# Patient Record
Sex: FEMALE | Race: WHITE | NOT HISPANIC OR LATINO | Employment: FULL TIME | URBAN - METROPOLITAN AREA
[De-identification: names, ages, dates, MRNs, and addresses within clinical notes are randomized per-mention and may not be internally consistent; named-entity substitution may affect disease eponyms.]

---

## 2017-06-09 ENCOUNTER — HOSPITAL ENCOUNTER (EMERGENCY)
Facility: MEDICAL CENTER | Age: 32
End: 2017-06-10
Attending: EMERGENCY MEDICINE
Payer: MEDICAID

## 2017-06-09 DIAGNOSIS — R10.13 EPIGASTRIC PAIN: ICD-10-CM

## 2017-06-09 LAB
ALBUMIN SERPL BCP-MCNC: 4.4 G/DL (ref 3.2–4.9)
ALBUMIN/GLOB SERPL: 1.3 G/DL
ALP SERPL-CCNC: 42 U/L (ref 30–99)
ALT SERPL-CCNC: 8 U/L (ref 2–50)
ANION GAP SERPL CALC-SCNC: 11 MMOL/L (ref 0–11.9)
AST SERPL-CCNC: 11 U/L (ref 12–45)
BASOPHILS # BLD AUTO: 0.4 % (ref 0–1.8)
BASOPHILS # BLD: 0.03 K/UL (ref 0–0.12)
BILIRUB SERPL-MCNC: 0.5 MG/DL (ref 0.1–1.5)
BUN SERPL-MCNC: 13 MG/DL (ref 8–22)
CALCIUM SERPL-MCNC: 10.1 MG/DL (ref 8.5–10.5)
CHLORIDE SERPL-SCNC: 106 MMOL/L (ref 96–112)
CO2 SERPL-SCNC: 20 MMOL/L (ref 20–33)
CREAT SERPL-MCNC: 0.84 MG/DL (ref 0.5–1.4)
EOSINOPHIL # BLD AUTO: 0.21 K/UL (ref 0–0.51)
EOSINOPHIL NFR BLD: 2.5 % (ref 0–6.9)
ERYTHROCYTE [DISTWIDTH] IN BLOOD BY AUTOMATED COUNT: 43.1 FL (ref 35.9–50)
GFR SERPL CREATININE-BSD FRML MDRD: >60 ML/MIN/1.73 M 2
GLOBULIN SER CALC-MCNC: 3.3 G/DL (ref 1.9–3.5)
GLUCOSE SERPL-MCNC: 85 MG/DL (ref 65–99)
HCT VFR BLD AUTO: 41.8 % (ref 37–47)
HGB BLD-MCNC: 13.8 G/DL (ref 12–16)
IMM GRANULOCYTES # BLD AUTO: 0.03 K/UL (ref 0–0.11)
IMM GRANULOCYTES NFR BLD AUTO: 0.4 % (ref 0–0.9)
LIPASE SERPL-CCNC: 14 U/L (ref 11–82)
LYMPHOCYTES # BLD AUTO: 2.3 K/UL (ref 1–4.8)
LYMPHOCYTES NFR BLD: 27.3 % (ref 22–41)
MCH RBC QN AUTO: 28.3 PG (ref 27–33)
MCHC RBC AUTO-ENTMCNC: 33 G/DL (ref 33.6–35)
MCV RBC AUTO: 85.7 FL (ref 81.4–97.8)
MONOCYTES # BLD AUTO: 0.39 K/UL (ref 0–0.85)
MONOCYTES NFR BLD AUTO: 4.6 % (ref 0–13.4)
NEUTROPHILS # BLD AUTO: 5.46 K/UL (ref 2–7.15)
NEUTROPHILS NFR BLD: 64.8 % (ref 44–72)
NRBC # BLD AUTO: 0 K/UL
NRBC BLD AUTO-RTO: 0 /100 WBC
PLATELET # BLD AUTO: 195 K/UL (ref 164–446)
PMV BLD AUTO: 12.2 FL (ref 9–12.9)
POTASSIUM SERPL-SCNC: 3.4 MMOL/L (ref 3.6–5.5)
PROT SERPL-MCNC: 7.7 G/DL (ref 6–8.2)
RBC # BLD AUTO: 4.88 M/UL (ref 4.2–5.4)
SODIUM SERPL-SCNC: 137 MMOL/L (ref 135–145)
WBC # BLD AUTO: 8.4 K/UL (ref 4.8–10.8)

## 2017-06-09 PROCEDURE — 99284 EMERGENCY DEPT VISIT MOD MDM: CPT

## 2017-06-09 PROCEDURE — 85025 COMPLETE CBC W/AUTO DIFF WBC: CPT

## 2017-06-09 PROCEDURE — 82272 OCCULT BLD FECES 1-3 TESTS: CPT

## 2017-06-09 PROCEDURE — 83690 ASSAY OF LIPASE: CPT

## 2017-06-09 PROCEDURE — 36415 COLL VENOUS BLD VENIPUNCTURE: CPT

## 2017-06-09 PROCEDURE — 80053 COMPREHEN METABOLIC PANEL: CPT

## 2017-06-09 ASSESSMENT — PAIN SCALES - GENERAL: PAINLEVEL_OUTOF10: 10

## 2017-06-09 NOTE — ED AVS SNAPSHOT
Home Care Instructions                                                                                                                Shante Chaudhry   MRN: [id number]    Department:  [facility], Emergency Dept   Date of Visit:  6/9/2017            [facility], Emergency Dept    [address] [place] [id number]    Phone:  [phone]      You were seen by     Ben Serrano M.D.      Your Diagnosis Was     Epigastric pain     R10.13       Follow-up Information     1. Follow up with [facility], Emergency Dept In 1 day.    Specialty:  Emergency Medicine    Why:  if not better    Contact information    [facility] [id number] [phone]      Medication Information     Review all of your home medications and newly ordered medications with your primary doctor and/or pharmacist as soon as possible. Follow medication instructions as directed by your doctor and/or pharmacist.     Please keep your complete medication list with you and share with your physician. Update the information when medications are discontinued, doses are changed, or new medications (including over-the-counter products) are added; and carry medication information at all times in the event of emergency situations.               Medication List      Notice     You have not been prescribed any medications.            Procedures and tests performed during your visit     CARDIAC MONITORING    CBC WITH DIFFERENTIAL    COMP METABOLIC PANEL    ESTIMATED GFR    LIPASE    O2 Protocol    SALINE LOCK        Discharge Instructions       Abdominal Pain, Adult  Many things can cause belly (abdominal) pain. Most times, the belly pain is not dangerous. Many cases of belly pain can be watched and treated at home.  HOME CARE   · Do not take medicines that help you go poop (laxatives) unless told to by your doctor.  · Only take medicine as told by your doctor.  · Eat or drink as told by  your doctor. Your doctor will tell you if you should be on a special diet.  GET HELP IF:  · You do not know what is causing your belly pain.  · You have belly pain while you are sick to your stomach (nauseous) or have runny poop (diarrhea).  · You have pain while you pee or poop.  · Your belly pain wakes you up at night.  · You have belly pain that gets worse or better when you eat.  · You have belly pain that gets worse when you eat fatty foods.  · You have a fever.  GET HELP RIGHT AWAY IF:   · The pain does not go away within 2 hours.  · You keep throwing up (vomiting).  · The pain changes and is only in the right or left part of the belly.  · You have bloody or tarry looking poop.  MAKE SURE YOU:   · Understand these instructions.  · Will watch your condition.  · Will get help right away if you are not doing well or get worse.     This information is not intended to replace advice given to you by your health care provider. Make sure you discuss any questions you have with your health care provider.     Document Released: 06/05/2009 Document Revised: 01/08/2016 Document Reviewed: 08/27/2014  [employer] Interactive Patient Education ©2016 [employer]            Patient Information     Patient Information    Following emergency treatment: all patient requiring follow-up care must return either to a private physician or a clinic if your condition worsens before you are able to obtain further medical attention, please return to the emergency room.     Billing Information    At [facility], we work to make the billing process streamlined for our patients.  Our Representatives are here to answer any questions you may have regarding your hospital bill.  If you have insurance coverage and have supplied your insurance information to us, we will submit a claim to your insurer on your behalf.  Should you have any questions regarding your bill, we can be reached online or by phone as follows:  Online: You are able pay your  bills online or live chat with our representatives about any billing questions you may have. We are here to help Monday - Friday from 8:00am to 7:30pm and 9:00am - 12:00pm on Saturdays.  Please visit [email or web address]  for more information.   Phone:  [phone] or [phone]    Please note that your emergency physician, surgeon, pathologist, radiologist, anesthesiologist, and other specialists are not employed by [facility] and will therefore bill separately for their services.  Please contact them directly for any questions concerning their bills at the numbers below:     Emergency Physician Services:  [phone]  [facility]:  [phone]  [facility]:  [phone]  [facility]:  [phone]    1. Your final bill may vary from the amount quoted upon discharge if all procedures are not complete at that time, or if your doctor has additional procedures of which we are not aware. You will receive an additional bill if you return to the Emergency Department at [facility] for suture removal regardless of the facility of which the sutures were placed.     2. Please arrange for settlement of this account at the emergency registration.    3. All self-pay accounts are due in full at the time of treatment.  If you are unable to meet this obligation then payment is expected within 4-5 days.     4. If you have had radiology studies (CT, X-ray, Ultrasound, MRI), you have received a preliminary result during your emergency department visit. Please contact the radiology department [phone] to receive a copy of your final result. Please discuss the Final result with your primary physician or with the follow up physician provided.     Crisis Hotline:  [place] Crisis Hotline:  0-688-BHQPSUQ or 1-892.877.1466  [place] Crisis Hotline:    1-341.607.6623 or 990-940-8578         ED Discharge Follow Up Questions    1. In order to provide  you with very good care, we would like to follow up with a phone call in the next few days.  May we have your permission to contact you?     YES /  NO    2. What is the best phone number to call you? (       )_____-__________    3. What is the best time to call you?      Morning  /  Afternoon  /  Evening                   Patient Signature:  ____________________________________________________________    Date:  ____________________________________________________________

## 2017-06-09 NOTE — ED AVS SNAPSHOT
QuVIS Access Code: [email or web address]  Expires: 7/10/2017 12:03 AM    QuVIS  A secure, online tool to manage your health information     [employer]’s QuVIS® is a secure, online tool that connects you to your personalized health information from the privacy of your home -- day or night - making it very easy for you to manage your healthcare. Once the activation process is completed, you can even access your medical information using the QuVIS dash, which is available for free in the Apple Dash store or Google Play store.     QuVIS provides the following levels of access (as shown below):   My Chart Features   [facility] Primary Care Doctor [facility]  Specialists [facility]  Urgent  Care Non-[facility]  Primary Care  Doctor   Email your healthcare team securely and privately 24/7 X X X X   Manage appointments: schedule your next appointment; view details of past/upcoming appointments X      Request prescription refills. X      View recent personal medical records, including lab and immunizations X X X X   View health record, including health history, allergies, medications X X X X   Read reports about your outpatient visits, procedures, consult and ER notes X X X X   See your discharge summary, which is a recap of your hospital and/or ER visit that includes your diagnosis, lab results, and care plan. X X       How to register for QuVIS:  1. Go to  [email or web address].  2. Click on the Sign Up Now box, which takes you to the New Member Sign Up page. You will need to provide the following information:  a. Enter your QuVIS Access Code exactly as it appears at the top of this page. (You will not need to use this code after you’ve completed the sign-up process. If you do not sign up before the expiration date, you must request a new code.)   b. Enter your date of birth.   c. Enter your home email address.   d. Click Submit, and follow the next screen’s instructions.  3. Create a QuVIS ID. This will be your QuVIS  login ID and cannot be changed, so think of one that is secure and easy to remember.  4. Create a Ember password. You can change your password at any time.  5. Enter your Password Reset Question and Answer. This can be used at a later time if you forget your password.   6. Enter your e-mail address. This allows you to receive e-mail notifications when new information is available in Ember.  7. Click Sign Up. You can now view your health information.    For assistance activating your Ember account, call [phone]

## 2017-06-09 NOTE — ED AVS SNAPSHOT
6/10/2017    Shante Chaudhry  [address]   [place] [address]    Dear Shante:    [facility] wants to ensure your discharge home is safe and you or your loved ones have had all of your questions answered regarding your care after you leave the hospital.    Below is a list of resources and contact information should you have any questions regarding your hospital stay, follow-up instructions, or active medical symptoms.    Questions or Concerns Regarding… Contact   Medical Questions Related to Your Discharge  (7 days a week, 8am-5pm) Contact a Nurse Care Coordinator   [phone]   Medical Questions Not Related to Your Discharge  (24 hours a day / 7 days a week)  Contact the Nurse Health Line   [phone]    Medications or Discharge Instructions Refer to your discharge packet   or contact your [facility] Primary Care Provider   [phone]   Follow-up Appointment(s) Schedule your appointment via WOMN   or contact Scheduling [phone]   Billing Review your statement via WOMN  or contact Billing [phone]   Medical Records Review your records via WOMN   or contact Medical Records [phone]     You may receive a telephone call within two days of discharge. This call is to make certain you understand your discharge instructions and have the opportunity to have any questions answered. You can also easily access your medical information, test results and upcoming appointments via the WOMN free online health management tool. You can learn more and sign up at Whistlestop/WOMN. For assistance setting up your WOMN account, please call [phone].    Once again, we want to ensure your discharge home is safe and that you have a clear understanding of any next steps in your care. If you have any questions or concerns, please do not hesitate to contact us, we are here for you. Thank you for choosing [facility] for your healthcare needs.    Sincerely,    Your [facility] Healthcare Team

## 2017-06-10 VITALS
SYSTOLIC BLOOD PRESSURE: 132 MMHG | TEMPERATURE: 97.3 F | WEIGHT: 276.46 LBS | HEIGHT: 63 IN | OXYGEN SATURATION: 96 % | RESPIRATION RATE: 19 BRPM | DIASTOLIC BLOOD PRESSURE: 72 MMHG | HEART RATE: 62 BPM | BODY MASS INDEX: 48.98 KG/M2

## 2017-06-10 NOTE — DISCHARGE INSTRUCTIONS
Abdominal Pain, Adult  Many things can cause belly (abdominal) pain. Most times, the belly pain is not dangerous. Many cases of belly pain can be watched and treated at home.  HOME CARE   · Do not take medicines that help you go poop (laxatives) unless told to by your doctor.  · Only take medicine as told by your doctor.  · Eat or drink as told by your doctor. Your doctor will tell you if you should be on a special diet.  GET HELP IF:  · You do not know what is causing your belly pain.  · You have belly pain while you are sick to your stomach (nauseous) or have runny poop (diarrhea).  · You have pain while you pee or poop.  · Your belly pain wakes you up at night.  · You have belly pain that gets worse or better when you eat.  · You have belly pain that gets worse when you eat fatty foods.  · You have a fever.  GET HELP RIGHT AWAY IF:   · The pain does not go away within 2 hours.  · You keep throwing up (vomiting).  · The pain changes and is only in the right or left part of the belly.  · You have bloody or tarry looking poop.  MAKE SURE YOU:   · Understand these instructions.  · Will watch your condition.  · Will get help right away if you are not doing well or get worse.     This information is not intended to replace advice given to you by your health care provider. Make sure you discuss any questions you have with your health care provider.     Document Released: 06/05/2009 Document Revised: 01/08/2016 Document Reviewed: 08/27/2014  [email or web address] Interactive Patient Education ©2016 [employer]

## 2017-06-10 NOTE — ED NOTES
Shante Chaudhry  31 y.o.  female  Chief Complaint   Patient presents with   • Abdominal Pain     Present to triage c/o diffused abdominal pain since 7 pm. Patient took pepto bismol around 6:30 pm. Described pain as sharp / stabbing. Denies N/V.

## 2017-06-10 NOTE — ED NOTES
Patient was educated on discharge instructions.  Patient was informed about diagnosis, symptom management, risks, and home care instructions.  Patient verbalized understanding and signed discharge instructions. Copy of discharge instructions in chart.  Patient ambulated out with family.  Patient has personal belongings and PIV removed, tip intact.  Work note given.

## 2017-06-10 NOTE — ED NOTES
"Pt wheeled to room with SO. PT c/o of midline ab pain 9/10 pain described as \"bubbly, gas\"  Once pt in room, pt states she passed gas and pain is now 5/10.  Pt took pepto at home and no relief.    "

## 2017-06-10 NOTE — ED PROVIDER NOTES
"ED Provider Note    CHIEF COMPLAINT  Chief Complaint   Patient presents with   • Abdominal Pain       HPI  Shante Chaudhry is a 31 y.o. female who presents to the emergency department with abdominal discomfort. The patient states the pain started in epigastric region couple of hours prior to arrival. She described it as burning. She states that she did pass some flatulence and since that time she's been significantly better. She has not had any associated vomiting. She denies fevers. She is not any prior abdominal surgeries. On my exam the patient was a symptomatic.    REVIEW OF SYSTEMS  See HPI for further details. All other systems are negative.     PAST MEDICAL HISTORY  Past Medical History   Diagnosis Date   • ASTHMA      dx as a child. no meds        SOCIAL HISTORY  Social History     Social History   • Marital Status: Single     Spouse Name: N/A   • Number of Children: N/A   • Years of Education: N/A     Social History Main Topics   • Smoking status: Current Some Day Smoker -- 0.50 packs/day     Types: Cigarettes     Last Attempt to Quit: 07/19/2011   • Smokeless tobacco: None   • Alcohol Use: No   • Drug Use: No   • Sexual Activity:     Partners: Male     Other Topics Concern   • None     Social History Narrative           PHYSICAL EXAM  VITAL SIGNS: /72 mmHg  Pulse 78  Temp(Src) 36.3 °C (97.3 °F) (Temporal)  Resp 18  Ht 1.6 m (5' 3\")  Wt 125.4 kg (276 lb 7.3 oz)  BMI 48.98 kg/m2  SpO2 96%  Constitutional: Well developed, Well nourished, No acute distress, Non-toxic appearance.   HENT: Normocephalic, Atraumatic, tympanic membranes are intact and nonerythematous bilaterally, Oropharynx moist without exudates or erythema, Nose normal.   Eyes: PERRLA, EOMI, Conjunctiva normal.  Neck: Supple without meningismus  Lymphatic: No lymphadenopathy noted.   Cardiovascular: Normal heart rate, Normal rhythm, No murmurs, No rubs, No gallops.   Thorax & Lungs: Normal breath sounds, No respiratory distress, No " wheezing, No chest tenderness.   Abdomen: Bowel sounds normal, Soft, No tenderness, no rebound, no guarding, no distention, No masses, No pulsatile masses.   Skin: Warm, Dry, No erythema, No rash.   Back: No tenderness, No CVA tenderness.   Genitalia: External genitalia appear normal, No masses or lesions. No discharge.   Rectal: Normal appearance, Normal sphincter tone. No external or internal lesions noted. Stool is normal color and heme negative.   Extremities: Atraumatic with symmetric distal pulses, No edema, No tenderness, No cyanosis, No clubbing.   Neurologic: Alert & oriented x 3, cranial nerves II through XII are intact, Normal motor function, Normal sensory function, No focal deficits noted.   Psychiatric: Affect normal, Judgment normal, Mood normal.       COURSE & MEDICAL DECISION MAKING  Pertinent Labs & Imaging studies reviewed. (See chart for details)  This a 31-year-old female who presents with epigastric abdominal discomfort. The patient states she's had some flatulence prior to my exam at this time she states she is asymptomatic. Blood work was drawn in triage and does not show any evidence of a leukocytosis to support a significant infectious source. Furthermore the patient's lipase and metabolic panel does not support pancreatitis nor cholecystitis. Gastritis was still be in the differential. Her abdomen is benign at this time. Therefore we'll discharge her home with instructions to return if she has any increase in discomfort. At this time I don't have a clear etiology. She does not have any urinary symptoms.    FINAL IMPRESSION  1. Abdominal pain     Disposition  The patient will be discharged in stable condition    Electronically signed by: Ben Serrano, 6/9/2017 11:40 PM

## 2017-06-10 NOTE — ED NOTES
Pt to quick look 2 for blood draw. Blood collected and sent to lab. Pt unable to urinate. Urine cup given to pt with instructions for clean catch urine sample. Pt back to lobby.

## 2017-09-26 ENCOUNTER — OFFICE VISIT (OUTPATIENT)
Dept: MEDICAL GROUP | Facility: MEDICAL CENTER | Age: 32
End: 2017-09-26
Attending: NURSE PRACTITIONER
Payer: MEDICAID

## 2017-09-26 VITALS
WEIGHT: 280 LBS | HEART RATE: 72 BPM | OXYGEN SATURATION: 96 % | DIASTOLIC BLOOD PRESSURE: 72 MMHG | HEIGHT: 62 IN | TEMPERATURE: 97.5 F | RESPIRATION RATE: 16 BRPM | SYSTOLIC BLOOD PRESSURE: 115 MMHG | BODY MASS INDEX: 51.53 KG/M2

## 2017-09-26 DIAGNOSIS — Z13.21 ENCOUNTER FOR VITAMIN DEFICIENCY SCREENING: ICD-10-CM

## 2017-09-26 DIAGNOSIS — Z13.220 SCREENING CHOLESTEROL LEVEL: ICD-10-CM

## 2017-09-26 DIAGNOSIS — E66.01 MORBID OBESITY WITH BMI OF 50.0-59.9, ADULT (HCC): ICD-10-CM

## 2017-09-26 DIAGNOSIS — F41.9 ANXIETY: ICD-10-CM

## 2017-09-26 DIAGNOSIS — Z71.6 ENCOUNTER FOR TOBACCO USE CESSATION COUNSELING: ICD-10-CM

## 2017-09-26 DIAGNOSIS — J40 BRONCHITIS: ICD-10-CM

## 2017-09-26 DIAGNOSIS — Z86.2 HX OF IRON DEFICIENCY ANEMIA: ICD-10-CM

## 2017-09-26 DIAGNOSIS — M25.551 ACUTE PAIN OF RIGHT HIP: ICD-10-CM

## 2017-09-26 DIAGNOSIS — Z01.419 ENCOUNTER FOR GYNECOLOGICAL EXAMINATION WITHOUT ABNORMAL FINDING: ICD-10-CM

## 2017-09-26 DIAGNOSIS — Z71.6 ENCOUNTER FOR SMOKING CESSATION COUNSELING: ICD-10-CM

## 2017-09-26 DIAGNOSIS — R05.9 COUGH: ICD-10-CM

## 2017-09-26 DIAGNOSIS — Z13.29 SCREENING FOR THYROID DISORDER: ICD-10-CM

## 2017-09-26 DIAGNOSIS — Z00.00 ROUTINE HEALTH MAINTENANCE: ICD-10-CM

## 2017-09-26 DIAGNOSIS — J45.20 MILD INTERMITTENT ASTHMA WITHOUT COMPLICATION: ICD-10-CM

## 2017-09-26 DIAGNOSIS — Z13.1 SCREENING FOR DIABETES MELLITUS (DM): ICD-10-CM

## 2017-09-26 PROCEDURE — 99204 OFFICE O/P NEW MOD 45 MIN: CPT | Performed by: NURSE PRACTITIONER

## 2017-09-26 PROCEDURE — 99203 OFFICE O/P NEW LOW 30 MIN: CPT | Performed by: NURSE PRACTITIONER

## 2017-09-26 RX ORDER — POLYETHYLENE GLYCOL 3350 17 G
2 POWDER IN PACKET (EA) ORAL EVERY 4 HOURS PRN
Qty: 120 LOZENGE | Refills: 0 | Status: SHIPPED | OUTPATIENT
Start: 2017-09-26 | End: 2019-06-12

## 2017-09-26 RX ORDER — DICLOFENAC SODIUM 75 MG/1
75 TABLET, DELAYED RELEASE ORAL 2 TIMES DAILY
Qty: 60 TAB | Refills: 1 | Status: SHIPPED | OUTPATIENT
Start: 2017-09-26 | End: 2019-06-12

## 2017-09-26 RX ORDER — AZITHROMYCIN 250 MG/1
TABLET, FILM COATED ORAL
Qty: 6 TAB | Refills: 0 | Status: SHIPPED | OUTPATIENT
Start: 2017-09-26 | End: 2019-06-12

## 2017-09-26 RX ORDER — BENZONATATE 100 MG/1
100 CAPSULE ORAL 3 TIMES DAILY PRN
Qty: 30 CAP | Refills: 0 | Status: SHIPPED | OUTPATIENT
Start: 2017-09-26 | End: 2019-06-12

## 2017-09-26 RX ORDER — ALBUTEROL SULFATE 90 UG/1
2 AEROSOL, METERED RESPIRATORY (INHALATION) EVERY 6 HOURS PRN
Qty: 8.5 G | Refills: 2 | Status: SHIPPED | OUTPATIENT
Start: 2017-09-26 | End: 2019-06-12 | Stop reason: SDUPTHER

## 2017-09-26 ASSESSMENT — PATIENT HEALTH QUESTIONNAIRE - PHQ9: CLINICAL INTERPRETATION OF PHQ2 SCORE: 0

## 2017-09-26 ASSESSMENT — PAIN SCALES - GENERAL: PAINLEVEL: NO PAIN

## 2017-09-26 NOTE — ASSESSMENT & PLAN NOTE
"Pt reports a few days of \"sick\"  Kids are sick. Runny nose, sore throat, cough. No fever.  Mucus is \"bright yellow\". Cut back to a few cigarettes per day  No wheezing or SOB. Ears are \"fine\". Feels \"like I want to go home and sleep\".  She has hx of Asthma and tobacco use - smoking.  "

## 2017-09-26 NOTE — ASSESSMENT & PLAN NOTE
"Pt is over weight and BMO today= 51.21.  She reports she \"stress eats\" and trying to decrease sugar and carbs.  Discussed checking labs including TSH and Pt to read labels and   Work on cutting down on Sugar/Carbs and re-address at f/u visit.    "

## 2017-09-26 NOTE — ASSESSMENT & PLAN NOTE
"Pt reports \"off and on\" for a few years.  Lately, the past month worse pain to right hip.  No known Injury. Using Ibuprofen otc and \"helps a little\".  Getting out of car or using stairs makes it worse.  Pt is over-weight and understands/.  Denies weakness or falls.  Denies swelling but states  :\"just hurts\" most of the time.  "

## 2017-09-26 NOTE — ASSESSMENT & PLAN NOTE
"Pt reports has smoked 1/2 to 1/4 ppd \"off and on\" for about 4 years.  Recently down to just a few cigarettes per day.  Is wanting to quit completley, but often feels like smoking again when   \"stressed out\". 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  Discussed low dose Nicotine lozenges to assist her rather than patches.  Pt agrees.  "

## 2017-09-26 NOTE — ASSESSMENT & PLAN NOTE
Asthma as child.   Rare flare-ups as adult.   Typically gets wheezing if bad URI.  Would like Albuterol Inhaler for rare use.  Is is the process of quitting smoking and we discussed the importance of this.  Pt denies current wheezing despite URI symptoms.

## 2017-09-26 NOTE — ASSESSMENT & PLAN NOTE
"Pt reports she has anxiety and [facility]  Has been having counseling and is helping.   When young was on antidepressant.   Denies depression. States anxiety is \"under control\"  Rare Panic Attacks and usually gets tightness in chest and tries relax.  Last panic attack 1 month ago.   "

## 2017-09-26 NOTE — PROGRESS NOTES
"    Chief Complaint   Patient presents with   • New Patient   • Cough   • Nasal Congestion   • Hip Pain     right        HISTORY OF THE PRESENT ILLNESS: Patient is a 31 y.o. female.  Shante presents to the clinic to establish care as a NEW PATIENT.    Her PMH includes:    Asthma  Depression/Anxiety  High Risk Pregnancy  Morbid Obesity  Left knuckle surgery  Left arm surgery-ORIF  Tubal ligation  Tobacco use-Smoker  Marijuana use  Obesity with pregnancy  Hip Pain, Right    Established with     Counseling -Therapy for anxiety--> [facility]      REview of REcords shows:  6/9/17 ER visit for Abd pain/epigastric discomfort, flatulence, Exam negative. Flatulence and pain subsided.  8/12/15 ER visit for SOB, No asthma exacerbations in over 10 yrs. , Cough, vomit 2' cough.  CXR normal.  RX for Prednisone and Albuterol Inhaler.    [facility] Report shows:  No entries.    Routine health maintenance  LMP = 9/5/17  Last PAP= 4 years ago. Reports some anxiety and emotional issues since a child about pelvic exams.  Wishes to see GYN and have  present for GYN/PAP exam.---> will refer to GYN.    Pt reports due for eye exam as typically wears glasses and has [facility] number at home and will call.  Pt not interested in [facility] info at this time.      Morbid obesity with BMI of 50.0-59.9, adult ([facility])  Pt is over weight and BMO today= 51.21.  She reports she \"stress eats\" and trying to decrease sugar and carbs.  Discussed checking labs including TSH and Pt to read labels and   Work on cutting down on Sugar/Carbs and re-address at f/u visit.    Acute pain of right hip  Pt reports \"off and on\" for a few years.  Lately, the past month worse pain to right hip.  No known Injury. Using Ibuprofen otc and \"helps a little\".  Getting out of car or using stairs makes it worse.  Pt is over-weight and understands/.  Denies weakness or falls.  Denies swelling but states  \"just hurts\" most of the time.    Cough  Pt reports a few " "days of \"sick\"  Kids are sick. Runny nose, sore throat, cough. No fever.  Mucus is \"bright yellow\". Cut back to a few cigarettes per day  No wheezing or SOB. Ears are \"fine\". Feels \"like I want to go home and sleep\".  She has hx of Asthma and tobacco use - smoking.    Mild intermittent asthma without complication  Asthma as child.   Rare flare-ups as adult.   Typically gets wheezing if bad URI.  Would like Albuterol Inhaler for rare use.  Is is the process of quitting smoking and we discussed the importance of this.  Pt denies current wheezing despite URI symptoms.    Anxiety  Pt reports she has anxiety and attends [facility]".  Has been having counseling and is helping.   When young was on antidepressant.   Denies depression. States anxiety is \"under control\"  Rare Panic Attacks and usually gets tightness in chest and tries relax.  Last panic attack 1 month ago. Not interested in Psychiatry or medication as states  \"i'm doing fine\"      Encounter for tobacco use cessation counseling  Pt reports has smoked 1/2 to 1/4 ppd \"off and on\" for about 4 years.  Recently down to just a few cigarettes per day.  Is wanting to quit completley, but often feels like smoking again when   \"stressed out\". 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  Discussed low dose Nicotine lozenges to assist her rather than patches.  Pt agrees.      Allergies: Nkda [no known drug allergy]    Current Outpatient Prescriptions Ordered in [facility]   Medication Sig Dispense Refill   • azithromycin (ZITHROMAX) 250 MG Tab Z pack-Take 2 tabs day one and then 1 tab daily for a total of 5 days 6 Tab 0   • benzonatate (TESSALON) 100 MG Cap Take 1 Cap by mouth 3 times a day as needed. 30 Cap 0   • albuterol 108 (90 Base) MCG/ACT Aero Soln inhalation aerosol Inhale 2 Puffs by mouth every 6 hours as needed for Shortness of Breath. 8.5 g 2   • diclofenac EC (VOLTAREN) 75 MG Tablet Delayed Response Take 1 Tab by mouth 2 times a day. 60 Tab 1   • nicotine " polacrilex (COMMIT) 2 MG lozenge Place 1 Lozenge in mouth by cheek every four hours as needed. 120 Lozenge 0     No current Epic-ordered facility-administered medications on file.        Past Medical History:   Diagnosis Date   • Anxiety    • ASTHMA     dx as a child. no meds    • Depression        Past Surgical History:   Procedure Laterality Date   • REPEAT C SECTION W TUBAL LIGATION  2013    Performed by Polly Wilson M.D. at [facility]   • TUBAL COAGULATION LAPAROSCOPIC BILATERAL     • PRIMARY C SECTION      twins    • OPEN REDUCTION      left arm   • OTHER ORTHOPEDIC SURGERY      left knuckle and left arm       Social History   Substance Use Topics   • Smoking status: Current Some Day Smoker     Packs/day: 0.25     Years: 4.00     Types: Cigarettes     Last attempt to quit: 2011   • Smokeless tobacco: Never Used   • Alcohol use Yes      Comment: social        Family Status   Relation Status   • Mother Alive   • Father Alive   • Sister Alive   • Maternal Grandmother    • Maternal Grandfather Alive   • Paternal Grandmother    • Paternal Grandfather Alive     Family History   Problem Relation Age of Onset   • Diabetes Maternal Grandmother      insulin   • Heart Disease Maternal Grandmother    • Cancer Paternal Grandfather      lung cancer (smoking)       Review of Systems   Constitutional: Negative for fever, chills, weight loss and malaise/fatigue. Positive for weight problem  HENT: Negative for ear pain, nosebleeds, congestion, sore throat and neck pain.    Eyes: Negative for blurred vision.   Respiratory: Negative for shortness of breath and wheezing. Positive for cough and nasal congestion    Cardiovascular: Negative for chest pain, palpitations, orthopnea and leg swelling.   Gastrointestinal: Negative for heartburn, nausea, vomiting and abdominal pain.   Genitourinary: Negative for dysuria, urgency and frequency.   Musculoskeletal: Negative for myalgias,  "back pain. Positive for Right Hip Pain   Skin: Negative for rash and itching.   Neurological: Negative for dizziness, tingling, tremors, sensory change, focal weakness and headaches.   Endo/Heme/Allergies: Does not bruise/bleed easily.   Psychiatric/Behavioral: Negative for depression or memory loss. Positive for anxiety       Current medications, allergies, and problem list reviewed with patient and updated in  [facility] today.      Exam: Blood pressure 115/72, pulse 72, temperature 36.4 °C (97.5 °F), resp. rate 16, height 1.575 m (5' 2\"), weight (!) 127 kg (280 lb), last menstrual period 09/05/2017, SpO2 96 %, not currently breastfeeding.  General: OBESE appearing. Mild distress.  HEENT: Normocephalic. Eyes conjunctiva clear lids without ptosis, pupils equal and reactive to light accommodation, ears normal shape and contour, canals are clear bilaterally, TM's are benign, nasal mucosa benign, oropharynx is without erythema, edema or exudates.   Neck: Supple without JVD. Thyroid is not enlarged. No lymph node swelling or anterior neck tenderness.  Pulmonary: Scattered rhonchi to upper lung fields to ausculation.  Normal effort. No rales or wheezing.  Cardiovascular: Regular rate and rhythm. Radial pulses are intact and equal bilaterally.  Abdomen: Soft, nontender, nondistended.   Neurologic: Grossly nonfocal  Lymph: No cervical or supraclavicular lymph nodes are palpable  Skin: Warm and dry.  No obvious lesions.  Musculoskeletal: Normal gait but slow. Good balance.. No extremity cyanosis, clubbing, or edema.  Psych: Normal mood and affect. Alert and oriented x3. Judgment and insight is normal.      Assessment/Plan  1. Routine health maintenance  CBC WITH DIFFERENTIAL   2. Screening for thyroid disorder  TSH   3. Encounter for vitamin deficiency screening  VITAMIN D,25 HYDROXY    VITAMIN B12   4. Screening for diabetes mellitus (DM)  COMP METABOLIC PANEL    HEMOGLOBIN A1C   5. Screening cholesterol level  LIPID PROFILE "   6. Morbid obesity with BMI of 50.0-59.9, adult ([facility])  Patient identified as having weight management issue.  Appropriate orders and counseling given.  Shante to reduce sugar/carb intake and read labels.   7. Acute pain of right hip  DX-HIP-BILATERAL-WITH PELVIS-2 VIEWS    diclofenac EC (VOLTAREN) 75 MG Tablet Delayed Response (Stop otc Ibuprofen)    REFERRAL TO PHYSICAL THERAPY Reason for Therapy: Eval/Treat/Report   8. Cough  benzonatate (TESSALON) 100 MG Cap   9. Anxiety  Continue w Counseling via [facility]   10. Hx of iron deficiency anemia  IRON/TOTAL IRON BIND    FERRITIN   11. Encounter for tobacco use cessation counseling  nicotine polacrilex (COMMIT) 2 MG lozenge   12. Bronchitis  azithromycin (ZITHROMAX) 250 MG Tab   13. Mild intermittent asthma without complication  albuterol 108 (90 Base) MCG/ACT Aero Soln inhalation aerosol        15. Encounter for gynecological examination without abnormal finding  REFERRAL TO GYNECOLOGY as Pt has anxiety and issues w pelvic exams. Last PAP ~ 4 yrs ago.   Follow up in 4 weeks Call or return if questions, concerns, or worsening condition.

## 2017-09-26 NOTE — ASSESSMENT & PLAN NOTE
LMP = 9/5/17  Last PAP= 4 years ago. Reports some anxiety and emotional issues since a child about pelvic exams.  Wishes to see GYN and have  present for GYN/PAP exam.---> will refer to GYN.    Pt reports due for eye exam as typically wears glasses and has [facility] number at home and will call.  Pt not interested in [facility] info at this time.

## 2018-05-25 ENCOUNTER — HOSPITAL ENCOUNTER (EMERGENCY)
Facility: MEDICAL CENTER | Age: 33
End: 2018-05-25
Attending: EMERGENCY MEDICINE
Payer: MEDICAID

## 2018-05-25 VITALS
SYSTOLIC BLOOD PRESSURE: 114 MMHG | OXYGEN SATURATION: 95 % | HEIGHT: 63 IN | HEART RATE: 83 BPM | RESPIRATION RATE: 18 BRPM | DIASTOLIC BLOOD PRESSURE: 43 MMHG | TEMPERATURE: 98.1 F | BODY MASS INDEX: 51.91 KG/M2 | WEIGHT: 293 LBS

## 2018-05-25 DIAGNOSIS — R05.8 PRODUCTIVE COUGH: ICD-10-CM

## 2018-05-25 DIAGNOSIS — J06.9 UPPER RESPIRATORY TRACT INFECTION, UNSPECIFIED TYPE: ICD-10-CM

## 2018-05-25 PROCEDURE — 700102 HCHG RX REV CODE 250 W/ 637 OVERRIDE(OP): Performed by: EMERGENCY MEDICINE

## 2018-05-25 PROCEDURE — 99284 EMERGENCY DEPT VISIT MOD MDM: CPT

## 2018-05-25 PROCEDURE — A9270 NON-COVERED ITEM OR SERVICE: HCPCS | Performed by: EMERGENCY MEDICINE

## 2018-05-25 RX ORDER — BENZONATATE 100 MG/1
100 CAPSULE ORAL 3 TIMES DAILY PRN
Qty: 15 CAP | Refills: 0 | Status: SHIPPED | OUTPATIENT
Start: 2018-05-25 | End: 2019-06-12

## 2018-05-25 RX ORDER — AZITHROMYCIN 250 MG/1
TABLET, FILM COATED ORAL
Qty: 6 TAB | Refills: 0 | Status: SHIPPED | OUTPATIENT
Start: 2018-05-25 | End: 2019-06-12

## 2018-05-25 RX ADMIN — HYDROCODONE BITARTRATE AND ACETAMINOPHEN 15 ML: 7.5; 325 SOLUTION ORAL at 23:02

## 2018-05-26 NOTE — ED PROVIDER NOTES
ED Provider Note    HPI: Patient is a 32-year-old female who presented to the emergency department May 25, 2018 at 9:38 PM with a chief complaint of cough and congestion.    Patient has had a nonproductive cough for several days but this evening it became productive of copious yellowish sputum. The patient denies headache neck stiffness or photophobia. No chest pain no shortness of breath except after coughing. No fever no chills. No nausea vomiting or abdominal pain. No other somatic complaints    Review of Systems: Positive for productive cough shortness of breath with cough negative for fever chills nausea vomiting abdominal pain headache neck stiffness photophobia chest pain    Past medical/surgical history: Obesity and asthma as a child anxiety depression    Medications: Albuterol when necessary    Allergies: None    Social History: Patient smokes one quarter pack of cigarettes per day social user of alcohol      Physical exam: Constitutional: Pleasant female awake alert  Vital signs:  Temperature 98.1 pulse 80 respirations 18 blood pressure 114/43 pulse oximetry 93% moist sounding cough noted.  Cardiac: Regular rate and rhythm. S1-S2 present. No S3 or S4 present. No murmurs, rubs, or gallops heard. No edema or varicosities were seen.   Lungs: Clear to auscultation with good aeration throughout. No wheezes, rales, or rhonchi heard. Patient's chest wall moved symmetrically with each respiratory effort. Patient was not making use of accessory muscles of respiration in breathing.  Abdomen: Soft nontender to palpation. No rebound or guarding elicited. No organomegaly identified. No pulsatile abdominal masses identified.   Neurologic: alert and awake answers questions appropriately. Moves all four extremities independently, no gross focal abnormalities identified. Normal strength and motor.  Skin: no rash or lesion seen, no palpable dermatologic lesions identified.      Medical decision making:  Patient has no signs  or symptoms of pneumonia or meningitis. Given the presence of productive cough without a course of antibiotics is reasonable patient discharged on a Z-Chris and Tessalon. Patient given the usual discharge instructions for URI. She is counseled to rest and push fluids. She was given a one-time dose of cough medication in the department. She'll follow up with primary care provider for recheck. She is carefully counseled to return to ED for worsening cough fever vomiting or any other problems    Patient verbalized understanding of these instructions and states she will comply    Impression #1) productive cough  #2) URI

## 2018-05-26 NOTE — ED NOTES
Pt given discharge instructions. Pt verbalized understanding. VSS. Pt ambulates with steady gait.

## 2018-05-26 NOTE — ED TRIAGE NOTES
"Shante Chaudhry  32 y.o. female  Chief Complaint   Patient presents with   • Cough     X 1 day. Pt states, \"I've been coughing alot of yellow phlegm\"   • Shortness of Breath       Pt amb to triage with steady gait for above complaint. Mask placed prior to triage. Pt denies fevers/chills at home.  Pt is alert and oriented, speaking in full sentences, follows commands and responds appropriately to questions. NAD. Resp are even and unlabored.  Pt placed in lobby. Pt educated on triage process. Pt encouraged to alert staff for any changes.    "

## 2019-01-03 ENCOUNTER — OFFICE VISIT (OUTPATIENT)
Dept: MEDICAL GROUP | Facility: MEDICAL CENTER | Age: 34
End: 2019-01-03
Attending: NURSE PRACTITIONER
Payer: MEDICAID

## 2019-01-03 VITALS
WEIGHT: 293 LBS | RESPIRATION RATE: 16 BRPM | OXYGEN SATURATION: 94 % | HEART RATE: 74 BPM | TEMPERATURE: 97.9 F | BODY MASS INDEX: 51.91 KG/M2 | DIASTOLIC BLOOD PRESSURE: 78 MMHG | HEIGHT: 63 IN | SYSTOLIC BLOOD PRESSURE: 122 MMHG

## 2019-01-03 DIAGNOSIS — E66.01 MORBID OBESITY WITH BMI OF 50.0-59.9, ADULT (HCC): ICD-10-CM

## 2019-01-03 DIAGNOSIS — F32.1 CURRENT MODERATE EPISODE OF MAJOR DEPRESSIVE DISORDER, UNSPECIFIED WHETHER RECURRENT (HCC): ICD-10-CM

## 2019-01-03 DIAGNOSIS — R10.11 RIGHT UPPER QUADRANT PAIN: ICD-10-CM

## 2019-01-03 LAB
APPEARANCE UR: CLEAR
BILIRUB UR STRIP-MCNC: NORMAL MG/DL
COLOR UR AUTO: YELLOW
GLUCOSE UR STRIP.AUTO-MCNC: NORMAL MG/DL
KETONES UR STRIP.AUTO-MCNC: NORMAL MG/DL
LEUKOCYTE ESTERASE UR QL STRIP.AUTO: NORMAL
NITRITE UR QL STRIP.AUTO: NORMAL
PH UR STRIP.AUTO: 6.5 [PH] (ref 5–8)
PROT UR QL STRIP: NORMAL MG/DL
RBC UR QL AUTO: NORMAL
SP GR UR STRIP.AUTO: 1.01
UROBILINOGEN UR STRIP-MCNC: 0.2 MG/DL

## 2019-01-03 PROCEDURE — 99213 OFFICE O/P EST LOW 20 MIN: CPT | Performed by: NURSE PRACTITIONER

## 2019-01-03 PROCEDURE — 99214 OFFICE O/P EST MOD 30 MIN: CPT | Performed by: FAMILY MEDICINE

## 2019-01-03 PROCEDURE — 81002 URINALYSIS NONAUTO W/O SCOPE: CPT | Performed by: FAMILY MEDICINE

## 2019-01-03 RX ORDER — NITROFURANTOIN MACROCRYSTALS 100 MG/1
100 CAPSULE ORAL 4 TIMES DAILY
Qty: 20 CAP | Refills: 0 | Status: SHIPPED | OUTPATIENT
Start: 2019-01-03 | End: 2019-06-12

## 2019-01-04 NOTE — PROGRESS NOTES
"Subjective:      Shante Chaudhry is a 33 y.o. female who presents with No chief complaint on file.            HPI 1.  Right upper quadrant pain-patient reports onset of persistent initially steady right upper quadrant pain 1 week ago.  That pain lasted for 2-3 days then was gone for about 24 hours.  Since then it has recurred at 4-5-minute episodes 2-3 times per day.  Patient reports that when the pain does come back even though it is brief it is extremely uncomfortable.  It does not seem to be precipitated by taking and fatty foods.  Patient reports that she actually is eating very little.  Patient is reporting normal stools for her-formed brown 2-3 times per day.  No recent emesis.  2.  Depression-patient reports that she was treated for depression but not since high school.  She did have sertraline in the past.  She reports that she has had increasing tearfulness and just a general feeling of high stress over the past 2-3 weeks.  She denies suicidal ideation, confusion.    ROS negative for recent hematuria, positive for low volume chronic urinary incontinence (2 years), negative for recent fever  Social history-, working  Current medication-  Prior to Admission medications    Medication Sig Start Date End Date Taking? Authorizing Provider   sertraline (ZOLOFT) 50 MG Tab Take 1 Tab by mouth every day. 1/3/19  Yes Charbel Moore M.D.                                                                          Objective:     /78 (BP Location: Left arm, Patient Position: Sitting, BP Cuff Size: Adult)   Pulse 74   Temp 36.6 °C (97.9 °F) (Temporal)   Resp 16   Ht 1.6 m (5' 3\")   Wt (!) 138.3 kg (305 lb)   SpO2 94%   BMI 54.03 kg/m²      Physical Exam  Gen.- alert, cooperative, in no acute distress  Neck- midline trachea, thyroid not enlarged or tender,supple, no cervical adenopathy  Chest-clear to auscultation and percussion with normal breath sounds. No retractions. Chest wall nontender  Cardiac- " regular rhythm and rate. No murmur, thrill, or heave  Psych-normal affect with good eye contact. Normal grooming. Oriented x4.  Abdomen- normal bowel sounds, soft without guarding. Liver and spleen not enlarged, no palpable masses or tenderness-indicated tenderness when it is present is lateral upper right  quadrant     UA-notable for positive nitrate and small leukocyte, negative RBC     Assessment/Plan:     1. Right upper quadrant pain      2. Morbid obesity with BMI of 50.0-59.9, adult (HCC)      3. Current moderate episode of major depressive disorder, unspecified whether recurrent (HCC)      Plan: 1.  Begin sertraline 50 mg nightly  2.  Patient is encouraged to limit portions and achieve gradual weight loss  3.  Rx of Macrodantin 100 mg 4 times daily  4.  Abdominal ultrasound

## 2019-04-23 ENCOUNTER — TELEPHONE (OUTPATIENT)
Dept: MEDICAL GROUP | Facility: MEDICAL CENTER | Age: 34
End: 2019-04-23

## 2019-04-23 NOTE — TELEPHONE ENCOUNTER
Left message with patient about no show to appointment today 4/23/19.  Explained that this was her first no show and the no show policy.

## 2019-06-12 ENCOUNTER — OFFICE VISIT (OUTPATIENT)
Dept: MEDICAL GROUP | Facility: MEDICAL CENTER | Age: 34
End: 2019-06-12
Attending: INTERNAL MEDICINE
Payer: MEDICAID

## 2019-06-12 VITALS
DIASTOLIC BLOOD PRESSURE: 80 MMHG | HEIGHT: 63 IN | SYSTOLIC BLOOD PRESSURE: 118 MMHG | BODY MASS INDEX: 51.91 KG/M2 | HEART RATE: 74 BPM | OXYGEN SATURATION: 95 % | WEIGHT: 293 LBS | TEMPERATURE: 98.5 F | RESPIRATION RATE: 16 BRPM

## 2019-06-12 DIAGNOSIS — J45.20 MILD INTERMITTENT ASTHMA WITHOUT COMPLICATION: ICD-10-CM

## 2019-06-12 DIAGNOSIS — K92.1 HEMATOCHEZIA: ICD-10-CM

## 2019-06-12 DIAGNOSIS — F41.9 ANXIETY: ICD-10-CM

## 2019-06-12 DIAGNOSIS — E66.01 MORBID OBESITY WITH BMI OF 50.0-59.9, ADULT (HCC): ICD-10-CM

## 2019-06-12 PROBLEM — Z71.6 ENCOUNTER FOR TOBACCO USE CESSATION COUNSELING: Status: RESOLVED | Noted: 2017-09-26 | Resolved: 2019-06-12

## 2019-06-12 PROBLEM — Z00.00 ROUTINE HEALTH MAINTENANCE: Status: RESOLVED | Noted: 2017-09-26 | Resolved: 2019-06-12

## 2019-06-12 PROBLEM — R05.9 COUGH: Status: RESOLVED | Noted: 2017-09-26 | Resolved: 2019-06-12

## 2019-06-12 PROBLEM — M25.551 ACUTE PAIN OF RIGHT HIP: Status: RESOLVED | Noted: 2017-09-26 | Resolved: 2019-06-12

## 2019-06-12 PROCEDURE — 99213 OFFICE O/P EST LOW 20 MIN: CPT | Performed by: INTERNAL MEDICINE

## 2019-06-12 PROCEDURE — 99204 OFFICE O/P NEW MOD 45 MIN: CPT | Performed by: INTERNAL MEDICINE

## 2019-06-12 RX ORDER — SERTRALINE HYDROCHLORIDE 100 MG/1
100 TABLET, FILM COATED ORAL DAILY
Qty: 30 TAB | Refills: 3 | Status: SHIPPED | OUTPATIENT
Start: 2019-06-12

## 2019-06-12 RX ORDER — ALBUTEROL SULFATE 90 UG/1
2 AEROSOL, METERED RESPIRATORY (INHALATION) EVERY 6 HOURS PRN
Qty: 8.5 G | Refills: 3 | Status: SHIPPED | OUTPATIENT
Start: 2019-06-12

## 2019-06-12 ASSESSMENT — PAIN SCALES - GENERAL: PAINLEVEL: 5=MODERATE PAIN

## 2019-06-12 NOTE — ASSESSMENT & PLAN NOTE
She has a history of asthma for which she uses an albuterol inhaler occasionally.  When she is feeling well she uses it only about once every few weeks.  If she is sick she needs it more.  She is requesting a refill today.
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
## 2019-06-12 NOTE — ASSESSMENT & PLAN NOTE
States that she has made some significant dietary changes recently.  She has stopped drinking soda and almost eliminated fast food.  She has increased her exercise over the past month and has been able to lose a little bit of weight.

## 2019-06-12 NOTE — LETTER
June 12, 2019         Patient: Shante Chaudhry   YOB: 1985   Date of Visit: 6/12/2019           To Whom it May Concern:    Shante Chaudhry was seen in my clinic on 6/12/2019. Please excuse her from work today.    If you have any questions or concerns, please don't hesitate to call.        Sincerely,           Maty Jordan M.D.  Electronically Signed

## 2019-06-12 NOTE — ASSESSMENT & PLAN NOTE
She reports a long history of anxiety.  She states that it has gotten significantly worse lately because she is very unhappy with her current job.  She has been taking Zoloft 50 mg daily since December and she reports initial benefit although she was at a different job at the time.  Now she feels like the medication is not quite cutting it.  In the past, she has seen a therapist but does not feel she has time for this currently.  She has been using breathing techniques and other behavioral tactics that she is learned over time to manage the anxiety in addition to the Zoloft.  She is in the process of looking for a new job.

## 2019-12-31 ENCOUNTER — TELEPHONE (OUTPATIENT)
Dept: MEDICAL GROUP | Facility: MEDICAL CENTER | Age: 34
End: 2019-12-31

## 2019-12-31 NOTE — TELEPHONE ENCOUNTER
Left message with friend to call me back. Will send letter about 2nd no show to appointment today 12/31/19.
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
